# Patient Record
Sex: MALE | Race: WHITE | Employment: OTHER | URBAN - METROPOLITAN AREA
[De-identification: names, ages, dates, MRNs, and addresses within clinical notes are randomized per-mention and may not be internally consistent; named-entity substitution may affect disease eponyms.]

---

## 2020-05-28 ENCOUNTER — APPOINTMENT (OUTPATIENT)
Dept: CT IMAGING | Age: 70
End: 2020-05-28
Payer: MEDICARE

## 2020-05-28 ENCOUNTER — APPOINTMENT (OUTPATIENT)
Dept: GENERAL RADIOLOGY | Age: 70
End: 2020-05-28
Payer: MEDICARE

## 2020-05-28 ENCOUNTER — HOSPITAL ENCOUNTER (EMERGENCY)
Age: 70
Discharge: HOME | End: 2020-05-28
Attending: EMERGENCY MEDICINE
Payer: MEDICARE

## 2020-05-28 VITALS
OXYGEN SATURATION: 96 % | HEART RATE: 88 BPM | TEMPERATURE: 98.1 F | RESPIRATION RATE: 16 BRPM | SYSTOLIC BLOOD PRESSURE: 159 MMHG | DIASTOLIC BLOOD PRESSURE: 108 MMHG

## 2020-05-28 LAB
ALBUMIN SERPL-MCNC: 4.6 GM/DL (ref 3.4–5)
ALP BLD-CCNC: 53 IU/L (ref 40–129)
ALT SERPL-CCNC: 19 U/L (ref 10–40)
ANION GAP SERPL CALCULATED.3IONS-SCNC: 13 MMOL/L (ref 4–16)
AST SERPL-CCNC: 23 IU/L (ref 15–37)
BASOPHILS ABSOLUTE: 0 K/CU MM
BASOPHILS RELATIVE PERCENT: 0.5 % (ref 0–1)
BILIRUB SERPL-MCNC: 0.5 MG/DL (ref 0–1)
BUN BLDV-MCNC: 17 MG/DL (ref 6–23)
CALCIUM SERPL-MCNC: 9.3 MG/DL (ref 8.3–10.6)
CHLORIDE BLD-SCNC: 103 MMOL/L (ref 99–110)
CO2: 27 MMOL/L (ref 21–32)
CREAT SERPL-MCNC: 1 MG/DL (ref 0.9–1.3)
DIFFERENTIAL TYPE: ABNORMAL
EOSINOPHILS ABSOLUTE: 0.1 K/CU MM
EOSINOPHILS RELATIVE PERCENT: 1.3 % (ref 0–3)
GFR AFRICAN AMERICAN: >60 ML/MIN/1.73M2
GFR NON-AFRICAN AMERICAN: >60 ML/MIN/1.73M2
GLUCOSE BLD-MCNC: 100 MG/DL (ref 70–99)
HCT VFR BLD CALC: 49 % (ref 42–52)
HEMOGLOBIN: 16.5 GM/DL (ref 13.5–18)
IMMATURE NEUTROPHIL %: 0.4 % (ref 0–0.43)
LYMPHOCYTES ABSOLUTE: 0.8 K/CU MM
LYMPHOCYTES RELATIVE PERCENT: 9.4 % (ref 24–44)
MCH RBC QN AUTO: 32.9 PG (ref 27–31)
MCHC RBC AUTO-ENTMCNC: 33.7 % (ref 32–36)
MCV RBC AUTO: 97.6 FL (ref 78–100)
MONOCYTES ABSOLUTE: 0.6 K/CU MM
MONOCYTES RELATIVE PERCENT: 6.5 % (ref 0–4)
NUCLEATED RBC %: 0 %
PDW BLD-RTO: 12.6 % (ref 11.7–14.9)
PLATELET # BLD: 218 K/CU MM (ref 140–440)
PMV BLD AUTO: 9.8 FL (ref 7.5–11.1)
POTASSIUM SERPL-SCNC: 4.2 MMOL/L (ref 3.5–5.1)
RBC # BLD: 5.02 M/CU MM (ref 4.6–6.2)
SEGMENTED NEUTROPHILS ABSOLUTE COUNT: 7 K/CU MM
SEGMENTED NEUTROPHILS RELATIVE PERCENT: 81.9 % (ref 36–66)
SODIUM BLD-SCNC: 143 MMOL/L (ref 135–145)
TOTAL IMMATURE NEUTOROPHIL: 0.03 K/CU MM
TOTAL NUCLEATED RBC: 0 K/CU MM
TOTAL PROTEIN: 6.8 GM/DL (ref 6.4–8.2)
TROPONIN T: <0.01 NG/ML
WBC # BLD: 8.5 K/CU MM (ref 4–10.5)

## 2020-05-28 PROCEDURE — 99284 EMERGENCY DEPT VISIT MOD MDM: CPT

## 2020-05-28 PROCEDURE — 72125 CT NECK SPINE W/O DYE: CPT

## 2020-05-28 PROCEDURE — 6360000002 HC RX W HCPCS: Performed by: PHYSICIAN ASSISTANT

## 2020-05-28 PROCEDURE — 84484 ASSAY OF TROPONIN QUANT: CPT

## 2020-05-28 PROCEDURE — 4500000027

## 2020-05-28 PROCEDURE — 70486 CT MAXILLOFACIAL W/O DYE: CPT

## 2020-05-28 PROCEDURE — 6370000000 HC RX 637 (ALT 250 FOR IP): Performed by: PHYSICIAN ASSISTANT

## 2020-05-28 PROCEDURE — 70450 CT HEAD/BRAIN W/O DYE: CPT

## 2020-05-28 PROCEDURE — 90471 IMMUNIZATION ADMIN: CPT | Performed by: PHYSICIAN ASSISTANT

## 2020-05-28 PROCEDURE — 90715 TDAP VACCINE 7 YRS/> IM: CPT | Performed by: PHYSICIAN ASSISTANT

## 2020-05-28 PROCEDURE — 80053 COMPREHEN METABOLIC PANEL: CPT

## 2020-05-28 PROCEDURE — 85025 COMPLETE CBC W/AUTO DIFF WBC: CPT

## 2020-05-28 PROCEDURE — 71045 X-RAY EXAM CHEST 1 VIEW: CPT

## 2020-05-28 PROCEDURE — 73130 X-RAY EXAM OF HAND: CPT

## 2020-05-28 PROCEDURE — 93005 ELECTROCARDIOGRAM TRACING: CPT | Performed by: PHYSICIAN ASSISTANT

## 2020-05-28 RX ORDER — DIAPER,BRIEF,INFANT-TODD,DISP
EACH MISCELLANEOUS ONCE
Status: COMPLETED | OUTPATIENT
Start: 2020-05-28 | End: 2020-05-28

## 2020-05-28 RX ADMIN — Medication 3 ML: at 17:18

## 2020-05-28 RX ADMIN — BACITRACIN ZINC: 500 OINTMENT TOPICAL at 20:10

## 2020-05-28 RX ADMIN — TETANUS TOXOID, REDUCED DIPHTHERIA TOXOID AND ACELLULAR PERTUSSIS VACCINE, ADSORBED 0.5 ML: 5; 2.5; 8; 8; 2.5 SUSPENSION INTRAMUSCULAR at 17:15
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
## 2020-05-29 LAB
EKG ATRIAL RATE: 75 BPM
EKG DIAGNOSIS: NORMAL
EKG P AXIS: 46 DEGREES
EKG P-R INTERVAL: 160 MS
EKG Q-T INTERVAL: 386 MS
EKG QRS DURATION: 106 MS
EKG QTC CALCULATION (BAZETT): 431 MS
EKG R AXIS: -25 DEGREES
EKG T AXIS: 13 DEGREES
EKG VENTRICULAR RATE: 75 BPM

## 2020-05-29 PROCEDURE — 93010 ELECTROCARDIOGRAM REPORT: CPT | Performed by: INTERNAL MEDICINE

## 2020-05-30 NOTE — ED PROVIDER NOTES
history. CURRENT MEDICATIONS        ALLERGIES    No Known Allergies    SOCIAL AND FAMILY HISTORY    Social History     Socioeconomic History    Marital status:      Spouse name: None    Number of children: None    Years of education: None    Highest education level: None   Occupational History    None   Social Needs    Financial resource strain: None    Food insecurity     Worry: None     Inability: None    Transportation needs     Medical: None     Non-medical: None   Tobacco Use    Smoking status: Never Smoker    Smokeless tobacco: Never Used   Substance and Sexual Activity    Alcohol use: None    Drug use: None    Sexual activity: None   Lifestyle    Physical activity     Days per week: None     Minutes per session: None    Stress: None   Relationships    Social connections     Talks on phone: None     Gets together: None     Attends Mandaen service: None     Active member of club or organization: None     Attends meetings of clubs or organizations: None     Relationship status: None    Intimate partner violence     Fear of current or ex partner: None     Emotionally abused: None     Physically abused: None     Forced sexual activity: None   Other Topics Concern    None   Social History Narrative    None     History reviewed. No pertinent family history. PHYSICAL EXAM    VITAL SIGNS: BP (!) 159/108   Pulse 88   Temp 98.1 °F (36.7 °C) (Oral)   Resp 16   SpO2 96%      Constitutional:  Well developed, well nourished, no acute distress. Scalp:  No swelling, discoloration. Skin intact. No palpable skull fracture. No florez sign. No raccoon sign. Neck / back:  No JVD. No swelling or discoloration on inspection. No posterior midline neck tenderness. Full range of motion without pain. No swelling, discoloration, or tenderness/palpable defect of remaining back exam.  No midline CTL spine tenderness palpation or palpable defect. HENT:   - PERRL. EOMI.   No obvious eyeball trauma, hyphema, hemorrhage, or conjunctival injection. Left Eyelid intact without obvious trauma. Right eyebrow and eyelid are both with lacerations present-see integumentary for further details. Right periorbital ecchymosis present with swelling.  - External auditory canals and TMs clear  - Oral cavity without injury. Dentition intact without obvious injury. Oropharynx clear. No trismus    -Nasal passages clear without blood, clear fluid, mass, septal mass/hematoma. Nose is without obvious deformity, tenderness, or palpable defect. - There is NO tenderness to palpation of the facial bones including orbits, maxilla and mandible reveals no focal tenderness or palpable defect, crepitus. There is however marked swelling and ecchymosis over the right zygomatic. No midface instability. Able to fully open and close jaw without pain or TMJ tenderness.      - No Perez sign, no raccoon sign. Cardiovascular:    Reg rate, no murmurs. Inspection of chest wall reveals no discoloration or swelling. There is no focal tenderness or palpable defect. Respiratory:   Nonlabored breathing. Lungs Clear, no retractions   GI:    No discoloration or swelling. Soft, nontender, normal bowel sounds    Musculoskeletal:    No edema, no acute deformities. Full range of motion of bilateral upper and lower extremities without deficit , pain, tenderness to palpation. Integument:    Multiple superficial abrasions are present on all extremities. There is a less than 0.5 cm x 2 mm skin avulsion present on dorsal aspect just lateral to left third MCP joint. No visible foreign body is present. Avulsion is superficial measuring depth of only 1 mm. Avulsion does not require repair. Lacerations are present of the right eyebrow and right eyelid. Right eyebrow laceration is linear and very mildly gaping measuring approximately 2 cm in length without visible foreign body or tendon/muscle involvement.   Right eyelid laceration is C-shaped and widely gaping quite deep with galea visible measuring approximately 3.6 cm in length without visible foreign body or tendon/muscle involvement. No other lacerations are present. See procedure note below for further details. Neurologic:    - Alert & oriented person, place, time, and situation, no speech difficulties or slurring.  - No obvious gross motor deficits  - Cranial nerves 2-12 grossly intact  - Sensation intact to light touch  - Strength 5/5 in upper and lower extremities bilaterally  - Normal finger to nose test bilaterally  - Rapid alternating movements intact  - Normal heel-shin bilaterally  - No pronator drift. - Light touch sensation intact throughout. - Upper and lower extremity DTRs 2+ bilaterally.   - Gait steady and without difficulty    Psych:  Cooperative, no abnormal behavior or hallucinations      LABS:  Results for orders placed or performed during the hospital encounter of 05/28/20   CBC Auto Differential   Result Value Ref Range    WBC 8.5 4.0 - 10.5 K/CU MM    RBC 5.02 4.6 - 6.2 M/CU MM    Hemoglobin 16.5 13.5 - 18.0 GM/DL    Hematocrit 49.0 42 - 52 %    MCV 97.6 78 - 100 FL    MCH 32.9 (H) 27 - 31 PG    MCHC 33.7 32.0 - 36.0 %    RDW 12.6 11.7 - 14.9 %    Platelets 503 369 - 247 K/CU MM    MPV 9.8 7.5 - 11.1 FL    Differential Type AUTOMATED DIFFERENTIAL     Segs Relative 81.9 (H) 36 - 66 %    Lymphocytes % 9.4 (L) 24 - 44 %    Monocytes % 6.5 (H) 0 - 4 %    Eosinophils % 1.3 0 - 3 %    Basophils % 0.5 0 - 1 %    Segs Absolute 7.0 K/CU MM    Lymphocytes Absolute 0.8 K/CU MM    Monocytes Absolute 0.6 K/CU MM    Eosinophils Absolute 0.1 K/CU MM    Basophils Absolute 0.0 K/CU MM    Nucleated RBC % 0.0 %    Total Nucleated RBC 0.0 K/CU MM    Total Immature Neutrophil 0.03 K/CU MM    Immature Neutrophil % 0.4 0 - 0.43 %   Comprehensive Metabolic Panel w/ Reflex to MG   Result Value Ref Range    Sodium 143 135 - 145 MMOL/L    Potassium 4.2 3.5 - 5.1 MMOL/L Chloride 103 99 - 110 mMol/L    CO2 27 21 - 32 MMOL/L    BUN 17 6 - 23 MG/DL    CREATININE 1.0 0.9 - 1.3 MG/DL    Glucose 100 (H) 70 - 99 MG/DL    Calcium 9.3 8.3 - 10.6 MG/DL    Alb 4.6 3.4 - 5.0 GM/DL    Total Protein 6.8 6.4 - 8.2 GM/DL    Total Bilirubin 0.5 0.0 - 1.0 MG/DL    ALT 19 10 - 40 U/L    AST 23 15 - 37 IU/L    Alkaline Phosphatase 53 40 - 129 IU/L    GFR Non-African American >60 >60 mL/min/1.73m2    GFR African American >60 >60 mL/min/1.73m2    Anion Gap 13 4 - 16   Troponin   Result Value Ref Range    Troponin T <0.010 <0.01 NG/ML   EKG 12 Lead   Result Value Ref Range    Ventricular Rate 75 BPM    Atrial Rate 75 BPM    P-R Interval 160 ms    QRS Duration 106 ms    Q-T Interval 386 ms    QTc Calculation (Bazett) 431 ms    P Axis 46 degrees    R Axis -25 degrees    T Axis 13 degrees    Diagnosis       Normal sinus rhythm  Possible Left atrial enlargement  Borderline ECG  No previous ECGs available  Confirmed by WINSOME James Si ([id number]) on 5/29/2020 6:10:55 PM             EKG:    EKG Interpretation  Please see ED physician's note for EKG interpretation - Dr. Ayden Landers    Imaging:  XR CHEST PORTABLE   Final Result   No acute process. XR HAND LEFT (MIN 3 VIEWS)   Final Result   No acute abnormality. CT Cervical Spine WO Contrast   Final Result   Multilevel degenerative changes with no acute abnormality of the cervical   spine. CT Facial Bones WO Contrast   Final Result   Right facial and right periorbital soft tissue swelling. Acute nondepressed right zygomatic arch fracture suspected (axial image   65-67). .         CT Head WO Contrast   Final Result   No acute intracranial abnormality.       Right periorbital soft tissue swelling.           ________________________________________________________________________       Procedure Note - Sherin Fierro PA-C      Laceration Repair Procedure Note    Indication: Skin Laceration- right eyebrow    Procedure:   - Procedure explained, including risks and benefits explained to the patient who expressed understanding. All questions were answered. Verbal consent obtained. - The Wound was prepped and draped in the usual sterile fashion using Hibiclens and sterile saline.  - The wound is anesthetized using LET gel approximately 1 ml  - Wound was explored to it's depth , no compromise of neurovascular structures, no foreign bodies. - Wound was irrigated with copious amounts of sterile saline and mechanically debrided utilizing sterile gauze. - The laceration was Closed with 6-0 Prolene sutures, total number of 3, all simple interrupted  - Hemostasis and good cosmesis was achieved. Blood loss minimal.  - The wound area was then dressed with Sterile nonstick dressing, sterile gauze, and tape. - Patient tolerated procedure well without complications. Post procedure exam of the affected region reveals surrounding sensation, motor, capillary refill, and pulses intact    Total repaired wound length: 2.0 cm    ________________________________________________________________________       Procedure Note - Desi Handy PA-C      Laceration Repair Procedure Note    Indication: Skin Laceration- right eyelid    Procedure:   - Procedure explained, including risks and benefits explained to the patient who expressed understanding. All questions were answered. Verbal consent obtained. - The Wound was prepped and draped in the usual sterile fashion using Hibiclens and sterile saline.  - The wound is anesthetized using LET gel approximately 2 ml  - Wound was explored to it's depth with galea visible, no compromise of neurovascular structures, no foreign bodies. - Wound was irrigated with copious amounts of sterile saline and mechanically debrided utilizing sterile gauze.   - The laceration was Closed with 6-0 Prolene and 5-0 Vicryl Rapide sutures, total number of 9, with 6 simple interrupted surface sutures of 6-0 Prolene and 3 deep sutures using initial encounter    7. Elevated blood pressure reading        Disposition referral (if applicable):  [facility] [address]  [phone]    Schedule an appointment as soon as possible for a visit   Recheck as soon as possible    [facility]  [address] [address] [facility] [address]  [phone]  Call today  [address] in 2 days, For wound re-check, For suture/staple removal in 3-5 days    Brian Goyal MD  [address] [place] [address] [facility]  [phone]    Schedule an appointment as soon as possible for a visit   Recheck as soon as possible    [facility] Emergency Department  [place] [facility] [phone]  Go to   Return to ED if symptoms worsen or new symptoms      Disposition medications (if applicable): There are no discharge medications for this patient. Comment: Please note this report has been produced using speech recognition software and may contain errors related to that system including errors in grammar, punctuation, and spelling, as well as words and phrases that may be inappropriate. If there are any questions or concerns please feel free to contact the dictating provider for clarification.                   Misty Lentz PA-C  05/30/20 2270

## 2022-04-01 ENCOUNTER — APPOINTMENT (OUTPATIENT)
Dept: CT IMAGING | Facility: HOSPITAL | Age: 72
End: 2022-04-01

## 2022-04-01 ENCOUNTER — HOSPITAL ENCOUNTER (EMERGENCY)
Facility: HOSPITAL | Age: 72
Discharge: HOME | End: 2022-04-01
Attending: EMERGENCY MEDICINE | Admitting: EMERGENCY MEDICINE

## 2022-04-01 ENCOUNTER — OFFICE VISIT (OUTPATIENT)
Dept: OTOLARYNGOLOGY | Facility: CLINIC | Age: 72
End: 2022-04-01

## 2022-04-01 VITALS — HEIGHT: 65 IN | WEIGHT: 166 LBS | TEMPERATURE: 97.8 F | BODY MASS INDEX: 27.66 KG/M2

## 2022-04-01 VITALS
OXYGEN SATURATION: 96 % | HEART RATE: 76 BPM | SYSTOLIC BLOOD PRESSURE: 155 MMHG | HEIGHT: 65 IN | DIASTOLIC BLOOD PRESSURE: 67 MMHG | TEMPERATURE: 97.4 F | WEIGHT: 164.9 LBS | BODY MASS INDEX: 27.47 KG/M2 | RESPIRATION RATE: 16 BRPM

## 2022-04-01 DIAGNOSIS — S01.311A COMPLEX LACERATION OF RIGHT EAR, INITIAL ENCOUNTER: Primary | ICD-10-CM

## 2022-04-01 DIAGNOSIS — S01.01XA LACERATION OF SCALP, INITIAL ENCOUNTER: Primary | ICD-10-CM

## 2022-04-01 DIAGNOSIS — S01.311A LACERATION OF AURICLE OF RIGHT EAR, INITIAL ENCOUNTER: ICD-10-CM

## 2022-04-01 PROCEDURE — 12051 INTMD RPR FACE/MM 2.5 CM/<: CPT | Performed by: OTOLARYNGOLOGY

## 2022-04-01 PROCEDURE — 99283 EMERGENCY DEPT VISIT LOW MDM: CPT

## 2022-04-01 PROCEDURE — 99204 OFFICE O/P NEW MOD 45 MIN: CPT | Performed by: OTOLARYNGOLOGY

## 2022-04-01 PROCEDURE — 70450 CT HEAD/BRAIN W/O DYE: CPT

## 2022-04-01 NOTE — ED NOTES
Wet to dry dressing applied to right ear per verbal instructions from Dr. Crabtree. Pt. Tolerated well.

## 2022-04-01 NOTE — PROGRESS NOTES
"Patient Name: Alexys Hoff   Visit Date: 04/01/2022   Patient ID: [id number]  Provider: Jas Valladares MD    Sex: male  Location: [facility]   YOB: 1950  Location Address: [address] [place] [address], [address] [place] [address],?[place]?[id number]    Primary Care Provider Provider, No Known  Location Phone: [phone]    Referring Provider: No ref. provider found        Chief Complaint  Ear Laceration (New patient )    Subjective    History of Present Illness  Alexys Hoff is a 71 y.o. male who presents to [facility] today as a consult from No ref. provider found.    He presents the clinic today as a consult from the emergency room for evaluation of a complex laceration of the right ear and scalp that took place earlier this morning around 6 AM when he fell after tripping while walking his dog.  He notes that he landed on his ear and right lateral scalp and had significant bleeding at the time of the injury.  Denies loss of consciousness.  Evaluation for this was performed in the emergency department, and Dr. Walton called me earlier this morning regarding the patient.  Due to the complex laceration of his auricle with through and through near amputation of the helix and cartilage involvement he felt that further evaluation and closure of the laceration was necessary and I agreed to see him today in the clinic.  The patient has not had any issues since transferring here from the ED.  He notes some mild pain.  The bleeding has stopped.    History reviewed. No pertinent past medical history.    History reviewed. No pertinent surgical history.    No current outpatient medications on file.     No Known Allergies    History reviewed. No pertinent family history.     Social History     Social History Narrative   • Not on file       Objective     Vital Signs:   Temp 97.8 °F (36.6 °C) (Temporal)   Ht 165.1 cm (65\")   Wt 75.3 kg (166 lb)   BMI 27.62 kg/m²   "     Physical Exam    Laceration Repair    Date/Time: 4/1/2022 4:12 PM  Performed by: Jas Valladares MD  Authorized by: Jas Valladares MD   Body area: head/neck  Location details: right ear  Foreign bodies: no foreign bodies  Tendon involvement: none  Nerve involvement: none  Vascular damage: no    Anesthesia:  Local Anesthetic: lidocaine 1% with epinephrine    Sedation:  Patient sedated: no    Irrigation solution: saline  Amount of cleaning: extensive  Debridement: none  Degree of undermining: minimal  Subcutaneous closure: 5-0 Vicryl  Technique: complex  Approximation: close  Approximation difficulty: complex  Comments: There was a complex laceration of the right auricle with near amputation of the upper portion of the auricle and helix.  The area was copiously cleansed with Betadine solution and anesthetized using 1% lidocaine with 1-100,000 epinephrine.  There was a complex laceration measuring 5 cm with through and through cartilage tear and involvement.  After prepping the area sterilely, the cartilage was reapproximated using 4-0 PDS suture.  Next, Monocryl suture was used to reapproximate the subcutaneous tissues and the helix was realigned.  Next fast gut suture was used in a double layer running fashion to close both the anterior and posterior skin with proper approximation of anatomical subunits of the ear.  CPT code 68897    There was a 2 cm laceration of the postauricular scalp down past the dermis into the subcuticular tissues.  This area was sterilely prepped and closed with 3 interrupted fast gut sutures.  CPT code 44106           Constitutional   Appearance  · : well developed, well-nourished, alert and in no acute distress, voice clear and strong    Head  Inspection  · : no deformities or lesions  Face  Inspection  · : No facial lesions; House-Brackmann I/VI bilaterally  Palpation  · : No TMJ crepitus nor  muscle tenderness bilaterally    Eyes  Vision  Visual Fields  · :  Extraocular movements are intact. No spontaneous or gaze-induced nystagmus.  Conjunctivae  · : clear  Sclerae  · : clear  Pupils and Irises  · : pupils equal, round, and reactive to light.     Ears, Nose, Mouth and Throat    Ears    External Ears  · : Complex through and through laceration with near amputation of the upper portion of the right ear with underlying laceration of the scalp in a separate location  Otoscopic Examination  · : Tympanic membrane appearance within normal limits bilaterally without perforations, well-aerated middle ears  Hearing  · : intact to conversational voice both ears  Tunning fork testing:     :    Nose    External Nose  · : appearance normal  Intranasal Exam  · : mucosa within normal limits, vestibules normal, no intranasal lesions present, septum midline, sinuses non tender to percussion  Oral Cavity    Oral Mucosa  · : oral mucosa normal without pallor or cyanosis  Lips  · : lip appearance normal  Teeth  · : normal dentition for age  Gums  · : gums pink, non-swollen, no bleeding present  Tongue  · : tongue appearance normal; normal mobility  Palate  · : hard palate normal, soft palate appearance normal with symmetric mobility    Throat    Oropharynx  · : no inflammation or lesions present, tonsils within normal limits  Hypopharynx  · : appearance within normal limits, superior epiglottis within normal limits  Larynx  · : appearance within normal limits, vocal cords within normal limits, no lesions present    Neck  Inspection/Palpation  · : normal appearance, no masses or tenderness, trachea midline; thyroid size normal, nontender, no nodules or masses present on palpation    Respiratory  Respiratory Effort  · : breathing unlabored  Inspection of Chest  · : normal appearance, no retractions    Cardiovascular  Heart  · : regular rate and rhythm    Lymphatic  Neck  · : no lymphadenopathy present  Supraclavicular Nodes  · : no lymphadenopathy present  Preauricular Nodes  · : no  lymphadenopathy present    Skin and Subcutaneous Tissue  General Inspection  · : Regarding face and neck - there are no rashes present, no lesions present, and no areas of discoloration    Neurologic  Cranial Nerves  · : cranial nerves II-XII are grossly intact bilaterally  Gait and Station  · : normal gait, able to stand without diffculty    Psychiatric  Judgement and Insight  · : judgment and insight intact  Mood and Affect  · : mood normal, affect appropriate          Assessment and Plan    Diagnoses and all orders for this visit:    1. Laceration of scalp, initial encounter (Primary)    2. Laceration of auricle of right ear, initial encounter    Other orders  -     Laceration Repair    The patient was evaluated and had a complex laceration of the right auricle and postauricular scalp.  After discussing the clinical particulars, I did recommend repair under local anesthesia.  I was able to perform the repair which took about 1 hour total time.  The patient tolerated this well.  Referred to dictated operative report within this note for further details.  I will have him address the surgical sites with triple antibiotic ointment 3 times per day for 5 days, and we will see him back in the clinic on an as-needed basis should there be any issues.    Follow Up   No follow-ups on file.  Patient was given instructions and counseling regarding his condition or for health maintenance advice. Please see specific information pulled into the AVS if appropriate.

## 2022-04-01 NOTE — ED PROVIDER NOTES
Subjective   Alexys Hoff is a 71 y.o. male who presents to the emergency department today with complaints s/p fall this morning. Pt states he was walking to his garage when he tripped and fell--hitting the right side of his face on a chair. He complains of subsequent laceration to his right ear. He has no other complaints at this time. He denies being on blood thinners. He denies headache, nausea, emesis, LOC, confusion, or weakness.       History provided by:  Patient   used: No        Review of Systems   Constitutional: Negative for chills and fever.   HENT: Negative for congestion, rhinorrhea and sore throat.    Eyes: Negative for pain and visual disturbance.   Respiratory: Negative for apnea, cough, chest tightness and shortness of breath.    Cardiovascular: Negative for chest pain and palpitations.   Gastrointestinal: Negative for abdominal pain, diarrhea, nausea and vomiting.   Genitourinary: Negative for difficulty urinating and dysuria.   Musculoskeletal: Negative for joint swelling and myalgias.   Skin: Positive for wound. Negative for color change.   Neurological: Negative for seizures and headaches.   Psychiatric/Behavioral: Negative.    All other systems reviewed and are negative.      History reviewed. No pertinent past medical history.    No Known Allergies    History reviewed. No pertinent surgical history.    History reviewed. No pertinent family history.    Social History     Socioeconomic History   • Marital status:          Objective   Physical Exam  Vitals and nursing note reviewed.   Constitutional:       General: He is not in acute distress.     Appearance: Normal appearance.   HENT:      Head: Normocephalic and atraumatic.      Ears:      Comments: 3cm laceration to the right ear with cartilage involvement through the antihelix .      Nose: Nose normal.      Mouth/Throat:      Pharynx: Oropharynx is clear.   Eyes:      General: No scleral icterus.      Conjunctiva/sclera: Conjunctivae normal.   Cardiovascular:      Rate and Rhythm: Normal rate and regular rhythm.      Heart sounds: Normal heart sounds. No murmur heard.  Pulmonary:      Effort: No respiratory distress.      Breath sounds: Normal breath sounds. No wheezing, rhonchi or rales.   Chest:      Chest wall: No tenderness.   Abdominal:      Palpations: Abdomen is soft.      Tenderness: There is no abdominal tenderness. There is no guarding or rebound.      Comments: No rigidity.   Musculoskeletal:         General: No tenderness. Normal range of motion.      Cervical back: Normal range of motion and neck supple.      Right lower leg: No edema.      Left lower leg: No edema.   Skin:     General: Skin is warm and dry.   Neurological:      Mental Status: He is alert. Mental status is at baseline.   Psychiatric:         Mood and Affect: Mood normal.         Behavior: Behavior normal.         Procedures         ED Course     CT Head Without Contrast    Result Date: 4/1/2022  PROCEDURE: CT HEAD WO CONTRAST  COMPARISON: None.  INDICATIONS: FALL.  HIT RIGHT SIDE OF HEAD.  PROTOCOL:   Standard CT imaging protocol performed.    RADIATION:   DLP: 954.7 mGy*cm   MA and/or KV were/was adjusted to minimize radiation dose.    TECHNIQUE: After obtaining the patient's consent, 122 CT images were obtained without non-ionic intravenous contrast material.  DISCUSSION:  A routine nonenhanced head CT was performed. No acute brain abnormality is identified. No acute intracranial hemorrhage. No acute infarction. No acute skull fracture. No midline shift or acute intracranial mass effect is seen.  Mild chronic small vessel ischemia/infarction is suspected. There are arterial calcifications. The extra-axial spaces and the ventricular system are mildly prominent.  There may be mild cerebellar tonsillar ectopia, likely a normal variant.  No acute findings are seen with regard to the imaged orbits or middle ear clefts.  There may be  mild chronic sinus disease.  No air-fluid interfaces are seen within the imaged paranasal sinuses.  There is asymmetric pneumatization of the right aspect of the petrous apex without associated congestive and/or inflammatory change.  Benign external auditory canal debris is suspected.  No unintended retained foreign body is suspected.        No acute brain abnormality is seen.  No acute intracranial hemorrhage.  No acute skull fracture.     SKYLER ROSE JR, MD       Electronically Signed and Approved By: SKYLER ROSE JR, MD on 4/01/2022 at 6:55                                                     MDM  Number of Diagnoses or Management Options  Complex laceration of right ear, initial encounter  Diagnosis management comments: Patient presents with a laceration of the right ear.  He fell against a chair sustaining the injury denies additional injuries headache or loss of consciousness.  He describes the pain is mild and declines pain medicine at this time.  Due to the complex nature of the laceration with cartilaginous involvement ear nose and throat consultation was obtained the patient is transferred to the ENT office following CT scan having been obtained in the ED which is read by radiology and reviewed by me is unremarkable.  Patient stable.       Amount and/or Complexity of Data Reviewed  Tests in the radiology section of CPT®: reviewed    Risk of Complications, Morbidity, and/or Mortality  Presenting problems: high  Management options: low    Patient Progress  Patient progress: stable      Final diagnoses:   Complex laceration of right ear, initial encounter       Documentation assistance provided by kraig Desir.  Information recorded by the kraig was done at my direction and has been verified and validated by me.     Ayaka Desir  04/01/22 [id number]       Jose Luis Crabtree MD  04/01/22 [id number]

## 2022-04-01 NOTE — ED TRIAGE NOTES
Pt to ED from home with reports of walking to garage and tripping, hitting right side of face on chair, lacerating right ear.      Pt denies blood thinner use, denies LOC/emesis.  Mild bleeding noted to large laceration to right ear.    Tetanus not up to date.

## 2022-04-07 PROCEDURE — 84443 ASSAY THYROID STIM HORMONE: CPT | Performed by: FAMILY MEDICINE

## 2022-04-07 PROCEDURE — 80053 COMPREHEN METABOLIC PANEL: CPT | Performed by: FAMILY MEDICINE

## 2022-04-07 PROCEDURE — 85025 COMPLETE CBC W/AUTO DIFF WBC: CPT | Performed by: FAMILY MEDICINE

## 2022-04-08 ENCOUNTER — TELEPHONE (OUTPATIENT)
Dept: URGENT CARE | Facility: CLINIC | Age: 72
End: 2022-04-08

## 2022-04-08 NOTE — TELEPHONE ENCOUNTER
----- Message from Marcelo Sanchez MD sent at 4/7/2022  9:06 PM EDT -----  Please call the patient regarding labs - they are unremarkable - keep follow-up with primary